# Patient Record
Sex: FEMALE | Race: ASIAN | NOT HISPANIC OR LATINO | Employment: OTHER | URBAN - METROPOLITAN AREA
[De-identification: names, ages, dates, MRNs, and addresses within clinical notes are randomized per-mention and may not be internally consistent; named-entity substitution may affect disease eponyms.]

---

## 2021-03-13 ENCOUNTER — APPOINTMENT (EMERGENCY)
Dept: CT IMAGING | Facility: HOSPITAL | Age: 56
End: 2021-03-13
Payer: COMMERCIAL

## 2021-03-13 ENCOUNTER — APPOINTMENT (EMERGENCY)
Dept: RADIOLOGY | Facility: HOSPITAL | Age: 56
End: 2021-03-13
Payer: COMMERCIAL

## 2021-03-13 ENCOUNTER — HOSPITAL ENCOUNTER (EMERGENCY)
Facility: HOSPITAL | Age: 56
Discharge: HOME | End: 2021-03-13
Attending: EMERGENCY MEDICINE | Admitting: EMERGENCY MEDICINE
Payer: COMMERCIAL

## 2021-03-13 VITALS
HEART RATE: 90 BPM | HEIGHT: 60 IN | SYSTOLIC BLOOD PRESSURE: 135 MMHG | OXYGEN SATURATION: 99 % | WEIGHT: 128.53 LBS | DIASTOLIC BLOOD PRESSURE: 75 MMHG | RESPIRATION RATE: 18 BRPM | BODY MASS INDEX: 25.23 KG/M2 | TEMPERATURE: 98.3 F

## 2021-03-13 DIAGNOSIS — R11.2 NAUSEA & VOMITING: Primary | ICD-10-CM

## 2021-03-13 DIAGNOSIS — R53.1 GENERALIZED WEAKNESS: ICD-10-CM

## 2021-03-13 LAB
ALBUMIN SERPL BCP-MCNC: 3.7 G/DL (ref 3.5–5)
ALP SERPL-CCNC: 102 U/L (ref 46–116)
ALT SERPL W P-5'-P-CCNC: 24 U/L (ref 12–78)
ANION GAP SERPL CALCULATED.3IONS-SCNC: 9 MMOL/L (ref 4–13)
APTT PPP: 34 SECONDS (ref 23–37)
AST SERPL W P-5'-P-CCNC: 15 U/L (ref 5–45)
ATRIAL RATE: 89 BPM
BACTERIA UR QL AUTO: NORMAL /HPF
BASOPHILS # BLD AUTO: 0.02 THOUSANDS/ΜL (ref 0–0.1)
BASOPHILS NFR BLD AUTO: 0 % (ref 0–1)
BILIRUB SERPL-MCNC: 0.29 MG/DL (ref 0.2–1)
BILIRUB UR QL STRIP: NEGATIVE
BUN SERPL-MCNC: 17 MG/DL (ref 5–25)
CALCIUM SERPL-MCNC: 9.3 MG/DL (ref 8.3–10.1)
CHLORIDE SERPL-SCNC: 102 MMOL/L (ref 100–108)
CLARITY UR: CLEAR
CO2 SERPL-SCNC: 25 MMOL/L (ref 21–32)
COLOR UR: YELLOW
CREAT SERPL-MCNC: 1.27 MG/DL (ref 0.6–1.3)
EOSINOPHIL # BLD AUTO: 0.09 THOUSAND/ΜL (ref 0–0.61)
EOSINOPHIL NFR BLD AUTO: 1 % (ref 0–6)
ERYTHROCYTE [DISTWIDTH] IN BLOOD BY AUTOMATED COUNT: 12.6 % (ref 11.6–15.1)
FLUAV RNA RESP QL NAA+PROBE: NEGATIVE
FLUBV RNA RESP QL NAA+PROBE: NEGATIVE
GFR SERPL CREATININE-BSD FRML MDRD: 47 ML/MIN/1.73SQ M
GLUCOSE SERPL-MCNC: 100 MG/DL (ref 65–140)
GLUCOSE UR STRIP-MCNC: NEGATIVE MG/DL
HCT VFR BLD AUTO: 33.8 % (ref 34.8–46.1)
HGB BLD-MCNC: 10.7 G/DL (ref 11.5–15.4)
HGB UR QL STRIP.AUTO: NEGATIVE
IMM GRANULOCYTES # BLD AUTO: 0.02 THOUSAND/UL (ref 0–0.2)
IMM GRANULOCYTES NFR BLD AUTO: 0 % (ref 0–2)
INR PPP: 1 (ref 0.84–1.19)
KETONES UR STRIP-MCNC: NEGATIVE MG/DL
LEUKOCYTE ESTERASE UR QL STRIP: ABNORMAL
LIPASE SERPL-CCNC: 242 U/L (ref 73–393)
LYMPHOCYTES # BLD AUTO: 1.78 THOUSANDS/ΜL (ref 0.6–4.47)
LYMPHOCYTES NFR BLD AUTO: 28 % (ref 14–44)
MCH RBC QN AUTO: 27.9 PG (ref 26.8–34.3)
MCHC RBC AUTO-ENTMCNC: 31.7 G/DL (ref 31.4–37.4)
MCV RBC AUTO: 88 FL (ref 82–98)
MONOCYTES # BLD AUTO: 0.24 THOUSAND/ΜL (ref 0.17–1.22)
MONOCYTES NFR BLD AUTO: 4 % (ref 4–12)
NEUTROPHILS # BLD AUTO: 4.18 THOUSANDS/ΜL (ref 1.85–7.62)
NEUTS SEG NFR BLD AUTO: 67 % (ref 43–75)
NITRITE UR QL STRIP: NEGATIVE
NON-SQ EPI CELLS URNS QL MICRO: NORMAL /HPF
NRBC BLD AUTO-RTO: 0 /100 WBCS
P AXIS: 59 DEGREES
PH UR STRIP.AUTO: 5.5 [PH] (ref 4.5–8)
PLATELET # BLD AUTO: 293 THOUSANDS/UL (ref 149–390)
PMV BLD AUTO: 10.5 FL (ref 8.9–12.7)
POTASSIUM SERPL-SCNC: 4.4 MMOL/L (ref 3.5–5.3)
PR INTERVAL: 174 MS
PROT SERPL-MCNC: 8.6 G/DL (ref 6.4–8.2)
PROT UR STRIP-MCNC: NEGATIVE MG/DL
PROTHROMBIN TIME: 13.3 SECONDS (ref 11.6–14.5)
QRS AXIS: 61 DEGREES
QRSD INTERVAL: 72 MS
QT INTERVAL: 360 MS
QTC INTERVAL: 428 MS
RBC # BLD AUTO: 3.84 MILLION/UL (ref 3.81–5.12)
RBC #/AREA URNS AUTO: NORMAL /HPF
RSV RNA RESP QL NAA+PROBE: NEGATIVE
SARS-COV-2 RNA RESP QL NAA+PROBE: NEGATIVE
SODIUM SERPL-SCNC: 136 MMOL/L (ref 136–145)
SP GR UR STRIP.AUTO: <=1.005 (ref 1–1.03)
T WAVE AXIS: 63 DEGREES
TROPONIN I SERPL-MCNC: <0.02 NG/ML
TSH SERPL DL<=0.05 MIU/L-ACNC: 1.24 UIU/ML (ref 0.36–3.74)
UROBILINOGEN UR QL STRIP.AUTO: 0.2 E.U./DL
VENTRICULAR RATE: 85 BPM
WBC # BLD AUTO: 6.33 THOUSAND/UL (ref 4.31–10.16)
WBC #/AREA URNS AUTO: NORMAL /HPF

## 2021-03-13 PROCEDURE — 85610 PROTHROMBIN TIME: CPT | Performed by: PHYSICIAN ASSISTANT

## 2021-03-13 PROCEDURE — 96361 HYDRATE IV INFUSION ADD-ON: CPT

## 2021-03-13 PROCEDURE — 99285 EMERGENCY DEPT VISIT HI MDM: CPT | Performed by: PHYSICIAN ASSISTANT

## 2021-03-13 PROCEDURE — 93010 ELECTROCARDIOGRAM REPORT: CPT | Performed by: INTERNAL MEDICINE

## 2021-03-13 PROCEDURE — 84443 ASSAY THYROID STIM HORMONE: CPT | Performed by: PHYSICIAN ASSISTANT

## 2021-03-13 PROCEDURE — 83690 ASSAY OF LIPASE: CPT | Performed by: PHYSICIAN ASSISTANT

## 2021-03-13 PROCEDURE — 96374 THER/PROPH/DIAG INJ IV PUSH: CPT

## 2021-03-13 PROCEDURE — 0241U HB NFCT DS VIR RESP RNA 4 TRGT: CPT | Performed by: PHYSICIAN ASSISTANT

## 2021-03-13 PROCEDURE — 85730 THROMBOPLASTIN TIME PARTIAL: CPT | Performed by: PHYSICIAN ASSISTANT

## 2021-03-13 PROCEDURE — 99285 EMERGENCY DEPT VISIT HI MDM: CPT

## 2021-03-13 PROCEDURE — 71045 X-RAY EXAM CHEST 1 VIEW: CPT

## 2021-03-13 PROCEDURE — 93005 ELECTROCARDIOGRAM TRACING: CPT

## 2021-03-13 PROCEDURE — 84484 ASSAY OF TROPONIN QUANT: CPT | Performed by: PHYSICIAN ASSISTANT

## 2021-03-13 PROCEDURE — 74177 CT ABD & PELVIS W/CONTRAST: CPT

## 2021-03-13 PROCEDURE — 85025 COMPLETE CBC W/AUTO DIFF WBC: CPT | Performed by: PHYSICIAN ASSISTANT

## 2021-03-13 PROCEDURE — 81001 URINALYSIS AUTO W/SCOPE: CPT

## 2021-03-13 PROCEDURE — 36415 COLL VENOUS BLD VENIPUNCTURE: CPT | Performed by: PHYSICIAN ASSISTANT

## 2021-03-13 PROCEDURE — 80053 COMPREHEN METABOLIC PANEL: CPT | Performed by: PHYSICIAN ASSISTANT

## 2021-03-13 RX ORDER — ONDANSETRON 4 MG/1
4 TABLET, ORALLY DISINTEGRATING ORAL EVERY 8 HOURS PRN
Qty: 15 TABLET | Refills: 0 | Status: SHIPPED | OUTPATIENT
Start: 2021-03-13

## 2021-03-13 RX ORDER — CLONAZEPAM 0.5 MG/1
0.5 TABLET ORAL
COMMUNITY

## 2021-03-13 RX ORDER — LEVOTHYROXINE SODIUM 0.1 MG/1
100 TABLET ORAL DAILY
COMMUNITY

## 2021-03-13 RX ORDER — ONDANSETRON 2 MG/ML
4 INJECTION INTRAMUSCULAR; INTRAVENOUS ONCE
Status: COMPLETED | OUTPATIENT
Start: 2021-03-13 | End: 2021-03-13

## 2021-03-13 RX ORDER — BISOPROLOL FUMARATE 5 MG/1
5 TABLET ORAL 2 TIMES DAILY
COMMUNITY

## 2021-03-13 RX ORDER — ASPIRIN 81 MG/1
81 TABLET, CHEWABLE ORAL DAILY
COMMUNITY

## 2021-03-13 RX ORDER — PSYLLIUM HUSK 0.4 G
1 CAPSULE ORAL DAILY
COMMUNITY

## 2021-03-13 RX ORDER — RABEPRAZOLE SODIUM 20 MG/1
20 TABLET, DELAYED RELEASE ORAL 2 TIMES DAILY
COMMUNITY

## 2021-03-13 RX ADMIN — ONDANSETRON 4 MG: 2 INJECTION INTRAMUSCULAR; INTRAVENOUS at 11:28

## 2021-03-13 RX ADMIN — SODIUM CHLORIDE 500 ML: 0.9 INJECTION, SOLUTION INTRAVENOUS at 13:48

## 2021-03-13 RX ADMIN — IOHEXOL 100 ML: 350 INJECTION, SOLUTION INTRAVENOUS at 12:58

## 2021-03-13 NOTE — ED PROVIDER NOTES
History  Chief Complaint   Patient presents with    Weakness - Generalized     weakness last 2 days; N/V     69-year-old female presents the emergency department with complaints of nausea and vomiting  States she has had worsening symptoms since yesterday along with generalized weakness over the past 2 days  No fevers at home  Has had an associated headache with some joint pain in her knees  Son states she came here from [place] several weeks ago and had a negative COVID test upon arrival   Did have her 1st COVID vaccine on 3/9/2021  Received the [facility] vaccine  States that side effects were minimal and have since resolved prior to these new symptoms starting  Denies chest pain or palpitations but states that she is a diabetic  History provided by:  Patient and relative   used: Yes        Prior to Admission Medications   Prescriptions Last Dose Informant Patient Reported? Taking? Calcium Carb-Cholecalciferol (Calcium 1000 + D) 1000-800 MG-UNIT TABS   Yes Yes   Sig: Take 1 tablet by mouth daily   RABEprazole (ACIPHEX) 20 MG tablet   Yes Yes   Sig: Take 20 mg by mouth 2 (two) times a day   TELMISARTAN-AMLODIPINE PO   Yes Yes   Sig: Take 1 tablet by mouth daily 40/5   aspirin 81 mg chewable tablet   Yes Yes   Sig: Chew 81 mg daily   bisoprolol (ZEBETA) 5 mg tablet   Yes Yes   Sig: Take 5 mg by mouth 2 (two) times a day   clonazePAM (KlonoPIN) 0 5 mg tablet   Yes Yes   Sig: Take 0 5 mg by mouth daily at bedtime   levothyroxine 100 mcg tablet   Yes Yes   Sig: Take 100 mcg by mouth daily   linaGLIPtin-metFORMIN HCl 2 5-850 MG TABS   Yes Yes   Sig: Take 1 tablet by mouth 2 (two) times a day      Facility-Administered Medications: None       Past Medical History:   Diagnosis Date    Diabetes mellitus ([facility] [address] )     Disease of thyroid gland     GERD (gastroesophageal reflux disease)     Hypertension        History reviewed  No pertinent surgical history  History reviewed   No pertinent family history  I have reviewed and agree with the history as documented  E-Cigarette/Vaping    E-Cigarette Use Never User      E-Cigarette/Vaping Substances     Social History     Tobacco Use    Smoking status: Never Smoker    Smokeless tobacco: Never Used   Substance Use Topics    Alcohol use: Never     Frequency: Never    Drug use: Never       Review of Systems   Constitutional: Negative for activity change, appetite change, chills and fever  HENT: Negative for congestion, dental problem, drooling, ear discharge, ear pain, mouth sores, nosebleeds, rhinorrhea, sore throat and trouble swallowing  Eyes: Negative for pain, discharge and itching  Respiratory: Negative for cough, chest tightness, shortness of breath and wheezing  Cardiovascular: Negative for chest pain and palpitations  Gastrointestinal: Positive for nausea and vomiting  Negative for abdominal pain, blood in stool, constipation and diarrhea  Endocrine: Negative for cold intolerance and heat intolerance  Genitourinary: Negative for difficulty urinating, dysuria, flank pain, frequency and urgency  Skin: Negative for rash and wound  Allergic/Immunologic: Negative for food allergies and immunocompromised state  Neurological: Positive for weakness  Negative for dizziness, seizures, syncope, numbness and headaches  Psychiatric/Behavioral: Negative for agitation, behavioral problems and confusion  Physical Exam  Physical Exam  Vitals signs and nursing note reviewed  Constitutional:       General: She is not in acute distress  Appearance: She is not diaphoretic  HENT:      Head: Normocephalic and atraumatic  Right Ear: External ear normal       Left Ear: External ear normal    Eyes:      Conjunctiva/sclera: Conjunctivae normal    Neck:      Vascular: No JVD  Trachea: No tracheal deviation  Cardiovascular:      Rate and Rhythm: Normal rate and regular rhythm  Heart sounds: Normal heart sounds   No murmur  No friction rub  No gallop  Pulmonary:      Effort: Pulmonary effort is normal  No respiratory distress  Breath sounds: Normal breath sounds  No wheezing or rales  Chest:      Chest wall: No tenderness  Abdominal:      General: Bowel sounds are normal  There is no distension  Palpations: Abdomen is soft  Tenderness: There is no abdominal tenderness  There is no right CVA tenderness, left CVA tenderness or guarding  Musculoskeletal: Normal range of motion  General: No tenderness or deformity  Lymphadenopathy:      Cervical: No cervical adenopathy  Skin:     General: Skin is warm and dry  Findings: No erythema or rash  Neurological:      Mental Status: She is alert and oriented to person, place, and time  Psychiatric:         Mood and Affect: Mood normal          Behavior: Behavior normal          Vital Signs  ED Triage Vitals [03/13/21 1059]   Temperature Pulse Respirations Blood Pressure SpO2   98 3 °F (36 8 °C) 93 18 138/77 99 %      Temp Source Heart Rate Source Patient Position - Orthostatic VS BP Location FiO2 (%)   Oral -- -- -- --      Pain Score       --           Vitals:    03/13/21 1059   BP: 138/77   Pulse: 93         Visual Acuity      ED Medications  Medications   sodium chloride 0 9 % bolus 500 mL (500 mL Intravenous New Bag 3/13/21 1348)   ondansetron (ZOFRAN) injection 4 mg (4 mg Intravenous Given 3/13/21 1128)   iohexol (OMNIPAQUE) 350 MG/ML injection (MULTI-DOSE) 100 mL (100 mL Intravenous Given 3/13/21 1258)       Diagnostic Studies  Results Reviewed     Procedure Component Value Units Date/Time    Urine Microscopic [[id number]] Collected: 03/13/21 1352    Lab Status:  In process Specimen: Urine, Other Updated: 03/13/21 1356    Urine Macroscopic, POC [[id number]]  (Abnormal) Collected: 03/13/21 1352    Lab Status: Final result Specimen: Urine Updated: 03/13/21 1353     Color, UA Yellow     Clarity, UA Clear     pH, UA 5 5     Leukocytes, UA Trace Nitrite, UA Negative     Protein, UA Negative mg/dl      Glucose, UA Negative mg/dl      Ketones, UA Negative mg/dl      Urobilinogen, UA 0 2 E U /dl      Bilirubin, UA Negative     Blood, UA Negative     Specific Gravity, UA <=1 005    Narrative:      CLINITEK RESULT    COVID19, Influenza A/B, RSV PCR, [facility] [[id number]]  (Normal) Collected: 03/13/21 1125    Lab Status: Final result Specimen: Nares from Nasopharyngeal Swab Updated: 03/13/21 1215     SARS-CoV-2 Negative     INFLUENZA A PCR Negative     INFLUENZA B PCR Negative     RSV PCR Negative    Narrative: This test has been authorized by FDA under an EUA (Emergency Use Assay) for use by authorized laboratories  Clinical caution and judgement should be used with the interpretation of these results with consideration of the clinical impression and other laboratory testing  Testing reported as "Positive" or "Negative" has been proven to be accurate according to standard laboratory validation requirements  All testing is performed with control materials showing appropriate reactivity at standard intervals      Comprehensive metabolic panel [[id number]]  (Abnormal) Collected: 03/13/21 1125    Lab Status: Final result Specimen: Blood from Arm, Right Updated: 03/13/21 1158     Sodium 136 mmol/L      Potassium 4 4 mmol/L      Chloride 102 mmol/L      CO2 25 mmol/L      ANION GAP 9 mmol/L      BUN 17 mg/dL      Creatinine 1 27 mg/dL      Glucose 100 mg/dL      Calcium 9 3 mg/dL      AST 15 U/L      ALT 24 U/L      Alkaline Phosphatase 102 U/L      Total Protein 8 6 g/dL      Albumin 3 7 g/dL      Total Bilirubin 0 29 mg/dL      eGFR 47 ml/min/1 73sq m     Narrative:      [place] guidelines for Chronic Kidney Disease (CKD):     Stage 1 with normal or high GFR (GFR > 90 mL/min/1 73 square meters)    Stage 2 Mild CKD (GFR = 60-89 mL/min/1 73 square meters)    Stage 3A Moderate CKD (GFR = 45-59 mL/min/1 73 square meters)    Stage 3B Moderate CKD (GFR = 30-44 mL/min/1 73 square meters)    Stage 4 Severe CKD (GFR = 15-29 mL/min/1 73 square meters)    Stage 5 End Stage CKD (GFR <15 mL/min/1 73 square meters)  Note: GFR calculation is accurate only with a steady state creatinine    Lipase [[id number]]  (Normal) Collected: 03/13/21 1125    Lab Status: Final result Specimen: Blood from Arm, Right Updated: 03/13/21 1158     Lipase 242 u/L     TSH [[id number]]  (Normal) Collected: 03/13/21 1125    Lab Status: Final result Specimen: Blood from Arm, Right Updated: 03/13/21 1158     TSH 3RD GENERATON 1 242 uIU/mL     Narrative:      Patients undergoing fluorescein dye angiography may retain small amounts of fluorescein in the body for 48-72 hours post procedure  Samples containing fluorescein can produce falsely depressed TSH values  If the patient had this procedure,a specimen should be resubmitted post fluorescein clearance        Troponin I [[id number]]  (Normal) Collected: 03/13/21 1117    Lab Status: Final result Specimen: Blood from Arm, Right Updated: 03/13/21 1151     Troponin I <0 02 ng/mL     Protime-INR [[id number]]  (Normal) Collected: 03/13/21 1123    Lab Status: Final result Specimen: Blood from Arm, Right Updated: 03/13/21 1143     Protime 13 3 seconds      INR 1 00    APTT [[id number]]  (Normal) Collected: 03/13/21 1123    Lab Status: Final result Specimen: Blood from Arm, Right Updated: 03/13/21 1143     PTT 34 seconds     CBC and differential [[id number]]  (Abnormal) Collected: 03/13/21 1123    Lab Status: Final result Specimen: Blood from Arm, Right Updated: 03/13/21 1137     WBC 6 33 Thousand/uL      RBC 3 84 Million/uL      Hemoglobin 10 7 g/dL      Hematocrit 33 8 %      MCV 88 fL      MCH 27 9 pg      MCHC 31 7 g/dL      RDW 12 6 %      MPV 10 5 fL      Platelets 674 Thousands/uL      nRBC 0 /100 WBCs      Neutrophils Relative 67 %      Immat GRANS % 0 %      Lymphocytes Relative 28 %      Monocytes Relative 4 %      Eosinophils Relative 1 %      Basophils Relative 0 %      Neutrophils Absolute 4 18 Thousands/µL      Immature Grans Absolute 0 02 Thousand/uL      Lymphocytes Absolute 1 78 Thousands/µL      Monocytes Absolute 0 24 Thousand/µL      Eosinophils Absolute 0 09 Thousand/µL      Basophils Absolute 0 02 Thousands/µL                  CT abdomen pelvis with contrast   Final Result by You Leon MD (03/13 1323)   Exam within normal limits  No acute abnormality identified  Workstation performed: [id number]      XR chest 1 view portable   ED Interpretation by Naina James PA-C (03/13 4638)   Clear lungs                 Procedures  ECG 12 Lead Documentation Only    Date/Time: 3/13/2021 11:22 AM  Performed by: Naina James PA-C  Authorized by: Naina James PA-C     Indications / Diagnosis:  Nausea/vomiting   ECG reviewed by me, the ED Provider: yes    Patient location:  ED  Previous ECG:     Previous ECG:  Unavailable  Interpretation:     Interpretation: abnormal    Rate:     ECG rate:  84    ECG rate assessment: normal    Rhythm:     Rhythm: sinus rhythm    Ectopy:     Ectopy: none    QRS:     QRS axis:  Normal    QRS intervals:  Normal  Conduction:     Conduction: normal    ST segments:     ST segments:  Normal  T waves:     T waves: non-specific               ED Course  ED Course as of Mar 13 1407   Sat Mar 13, 2021   1234 Discussed results with patient and son at this time  Will proceed with CT       1346 Discussed test results with patient and son  Will start zofran at home  Return to ER with any development of abdominal pain, diarrhea, blood in the stool, or fever  SBIRT 20yo+      Most Recent Value   SBIRT (22 yo +)   In order to provide better care to our patients, we are screening all of our patients for alcohol and drug use  Would it be okay to ask you these screening questions? Yes Filed at: 03/13/2021 1107   Initial Alcohol Screen: US AUDIT-C    1   How often do you have a drink containing alcohol?  0 Filed at: 03/13/2021 1107   2  How many drinks containing alcohol do you have on a typical day you are drinking? 0 Filed at: 03/13/2021 1107   3a  Male UNDER 65: How often do you have five or more drinks on one occasion? 0 Filed at: 03/13/2021 1107   3b  FEMALE Any Age, or MALE 65+: How often do you have 4 or more drinks on one occassion? 0 Filed at: 03/13/2021 1107   Audit-C Score  0 Filed at: 03/13/2021 1107   GARO: How many times in the past year have you    Used an illegal drug or used a prescription medication for non-medical reasons? Never Filed at: 03/13/2021 1107                    MDM  Number of Diagnoses or Management Options  Generalized weakness:   Nausea & vomiting:   Diagnosis management comments: Differential diagnosis includes but not limited to: ACS, gastritis, gastroenteritis, intra-abdominal infection, COVID       Amount and/or Complexity of Data Reviewed  Clinical lab tests: ordered and reviewed  Tests in the radiology section of CPT®: ordered and reviewed        Disposition  Final diagnoses:   Nausea & vomiting   Generalized weakness     Time reflects when diagnosis was documented in both MDM as applicable and the Disposition within this note     Time User Action Codes Description Comment    3/13/2021  1:37 PM Jocelyn [place] [R11 2] Nausea & vomiting     3/13/2021  1:37 PM Kirk [place] [R53 1] Generalized weakness       ED Disposition     ED Disposition Condition Date/Time Comment    Discharge Stable Sat Mar 13, 2021  1:37 PM Asa Olivarez discharge to home/self care              Follow-up Information    None         Patient's Medications   Discharge Prescriptions    ONDANSETRON (ZOFRAN ODT) 4 MG DISINTEGRATING TABLET    Take 1 tablet (4 mg total) by mouth every 8 (eight) hours as needed for nausea for up to 15 doses       Start Date: 3/13/2021 End Date: --       Order Dose: 4 mg       Quantity: 15 tablet    Refills: 0     No discharge procedures on file     PDMP Review     None          ED Provider  Electronically Signed by           Florencia Whaley PA-C  03/13/21 5382